# Patient Record
Sex: MALE | Race: WHITE | Employment: FULL TIME | URBAN - METROPOLITAN AREA
[De-identification: names, ages, dates, MRNs, and addresses within clinical notes are randomized per-mention and may not be internally consistent; named-entity substitution may affect disease eponyms.]

---

## 2019-02-19 ENCOUNTER — OFFICE VISIT (OUTPATIENT)
Dept: FAMILY MEDICINE CLINIC | Facility: CLINIC | Age: 44
End: 2019-02-19
Payer: COMMERCIAL

## 2019-02-19 VITALS
WEIGHT: 176 LBS | DIASTOLIC BLOOD PRESSURE: 86 MMHG | TEMPERATURE: 99 F | OXYGEN SATURATION: 98 % | RESPIRATION RATE: 16 BRPM | SYSTOLIC BLOOD PRESSURE: 138 MMHG | BODY MASS INDEX: 21.88 KG/M2 | HEART RATE: 92 BPM | HEIGHT: 75 IN

## 2019-02-19 DIAGNOSIS — G62.9 NEUROPATHY: Primary | ICD-10-CM

## 2019-02-19 DIAGNOSIS — F10.20 ALCOHOLISM (HCC): ICD-10-CM

## 2019-02-19 DIAGNOSIS — F41.9 ANXIETY: ICD-10-CM

## 2019-02-19 PROBLEM — F95.2 TOURETTE SYNDROME: Status: ACTIVE | Noted: 2019-02-19

## 2019-02-19 PROBLEM — I48.0 PAROXYSMAL ATRIAL FIBRILLATION (HCC): Status: ACTIVE | Noted: 2018-01-06

## 2019-02-19 PROBLEM — J84.82 LANGERHANS CELL HISTIOCYTOSIS OF LUNG (HCC): Status: ACTIVE | Noted: 2019-02-19

## 2019-02-19 PROBLEM — S03.00XA TMJ (DISLOCATION OF TEMPOROMANDIBULAR JOINT), INITIAL ENCOUNTER: Status: ACTIVE | Noted: 2018-01-06

## 2019-02-19 PROBLEM — F10.282 ALCOHOL DEPENDENCE WITH ALCOHOL-INDUCED SLEEP DISORDER (HCC): Status: ACTIVE | Noted: 2017-03-10

## 2019-02-19 PROCEDURE — 99203 OFFICE O/P NEW LOW 30 MIN: CPT | Performed by: FAMILY MEDICINE

## 2019-02-19 RX ORDER — RANITIDINE 150 MG/1
CAPSULE ORAL 2 TIMES DAILY
COMMUNITY
End: 2020-03-26 | Stop reason: ALTCHOICE

## 2019-02-19 RX ORDER — LORAZEPAM 1 MG/1
TABLET ORAL
Refills: 1 | COMMUNITY
Start: 2019-02-04 | End: 2019-05-03

## 2019-02-19 RX ORDER — GABAPENTIN 100 MG/1
100 CAPSULE ORAL NIGHTLY
Qty: 30 CAPSULE | Refills: 0 | Status: SHIPPED | OUTPATIENT
Start: 2019-02-19 | End: 2019-03-04

## 2019-02-19 NOTE — PROGRESS NOTES
Enrrique Julian is a 39year old male. Patient presents with:  Establish Care: numbness in both feet, tingling, pins and needle-feeling, feet look discolored, saw podiatrist, painful  Depression      HPI:   Burning, tingling, numbness in feet and legs.  Start G            RASH        Current Outpatient Medications:  gabapentin 100 MG Oral Cap Take 1 capsule (100 mg total) by mouth nightly. Disp: 30 capsule Rfl: 0   metoprolol Tartrate 25 MG Oral Tab Take 25 mg by mouth 2 (two) times daily.  Disp:  Rfl: 1   [facility]: alert and appropriate, CN II-XII intact, motor 5/5 b/l UE and LE and sensation decreased in lower legs and feet, almost no sensation on b/l feet to monofilament testing, DTR's 2+ in b/l UE and LE      ASSESSMENT AND PLAN:     Neuropathy  (primary issues and agrees to the plan.

## 2019-03-04 ENCOUNTER — TELEPHONE (OUTPATIENT)
Dept: FAMILY MEDICINE CLINIC | Facility: CLINIC | Age: 44
End: 2019-03-04

## 2019-03-04 DIAGNOSIS — G57.93 NEUROPATHY INVOLVING BOTH LOWER EXTREMITIES: Primary | ICD-10-CM

## 2019-03-04 DIAGNOSIS — G62.9 NEUROPATHY: ICD-10-CM

## 2019-03-04 RX ORDER — GABAPENTIN 100 MG/1
100 CAPSULE ORAL 3 TIMES DAILY
Qty: 90 CAPSULE | Refills: 0 | Status: SHIPPED | OUTPATIENT
Start: 2019-03-04 | End: 2019-03-20

## 2019-03-04 NOTE — TELEPHONE ENCOUNTER
Patient notified and verbalized understanding.      Patient advised office will call once we get response from insurance to let him know if ok to schedule    Hold for authorization

## 2019-03-04 NOTE — TELEPHONE ENCOUNTER
Patient notified records have been requested so Dr Chuck Hernandez can reviewed. Patient states numbness in feet and legs is worsening. Gabapentin is not helping. Hands are having numbness now too. States [place] [facility] fine.  They rec

## 2019-03-04 NOTE — TELEPHONE ENCOUNTER
Lets increase the gabapentin to TID. Looks like he had an appt today with Dr. Eyad Pascual, but couldn't make it? Lets see if higher dose gabapentin helps in the mean time, and get records.      Will get MRI in the mean time and see if we can get them appro

## 2019-03-04 NOTE — TELEPHONE ENCOUNTER
Pt said that KE referred him to a neurologist, but it isn't for 2 weeks and it is a consult appt. Pt is in such pain, he went to [facility] ER and they adivsed him that he should have his MD order an MRI of his head and his back.  Pt asks if KE will place order for

## 2019-03-05 ENCOUNTER — MED REC SCAN ONLY (OUTPATIENT)
Dept: FAMILY MEDICINE CLINIC | Facility: CLINIC | Age: 44
End: 2019-03-05

## 2019-03-07 NOTE — TELEPHONE ENCOUNTER
Patient notified and verbalized understanding. Number to central scheduling provided to patient. Hold to confirm patient was able to schedule.

## 2019-03-07 NOTE — TELEPHONE ENCOUNTER
Called and spoke with Dr. Paris Trimble at [facility] for peer to peer. After discussion, both MRI's were approved with the following approval codes.      Brain MRI: [id number]    Exp 4/21/19    Lumbar MRI: [id number]  Exp 4/21/19

## 2019-03-07 NOTE — TELEPHONE ENCOUNTER
Per referral notes:  ----- Message -----  From: Earlene Núñez  Sent: 3/6/2019   9:07 AM  To: Krystina Payne Clinical Staff  Subject: PTP                                               Good morning!     Per [place]:  Case Number [id number]  A Ph

## 2019-03-08 NOTE — TELEPHONE ENCOUNTER
Future Appointments   Date Time Provider Rosa Delgado   3/11/2019 12:45 PM PF MRI MOBILE (1.5T) PF MRI [facility]   3/11/2019  1:30 PM PF MRI MOBILE (1.5T) PF MRI [facility]   3/20/2019  3:20 PM MD JEYSON Guerrero EMG Evonne Arzola

## 2019-03-15 ENCOUNTER — HOSPITAL ENCOUNTER (OUTPATIENT)
Dept: MRI IMAGING | Age: 44
Discharge: HOME | End: 2019-03-15
Attending: FAMILY MEDICINE
Payer: COMMERCIAL

## 2019-03-15 DIAGNOSIS — G62.9 NEUROPATHY: ICD-10-CM

## 2019-03-15 DIAGNOSIS — G57.93 NEUROPATHY INVOLVING BOTH LOWER EXTREMITIES: ICD-10-CM

## 2019-03-15 PROCEDURE — 70553 MRI BRAIN STEM W/O & W/DYE: CPT | Performed by: FAMILY MEDICINE

## 2019-03-15 PROCEDURE — A9575 INJ GADOTERATE MEGLUMI 0.1ML: HCPCS | Performed by: FAMILY MEDICINE

## 2019-03-15 PROCEDURE — 72148 MRI LUMBAR SPINE W/O DYE: CPT | Performed by: FAMILY MEDICINE

## 2019-03-20 ENCOUNTER — OFFICE VISIT (OUTPATIENT)
Dept: NEUROLOGY | Facility: CLINIC | Age: 44
End: 2019-03-20
Payer: COMMERCIAL

## 2019-03-20 VITALS
WEIGHT: 176 LBS | DIASTOLIC BLOOD PRESSURE: 88 MMHG | RESPIRATION RATE: 16 BRPM | BODY MASS INDEX: 21.88 KG/M2 | SYSTOLIC BLOOD PRESSURE: 131 MMHG | HEART RATE: 86 BPM | HEIGHT: 75 IN

## 2019-03-20 DIAGNOSIS — M79.2 NEUROPATHIC PAIN: ICD-10-CM

## 2019-03-20 DIAGNOSIS — R25.3 FASCICULATIONS OF MUSCLE: ICD-10-CM

## 2019-03-20 DIAGNOSIS — R29.2 BRISK DEEP TENDON REFLEXES: ICD-10-CM

## 2019-03-20 DIAGNOSIS — R29.2 BABINSKI SIGN: ICD-10-CM

## 2019-03-20 DIAGNOSIS — F10.10 ALCOHOL ABUSE: ICD-10-CM

## 2019-03-20 DIAGNOSIS — R20.2 NUMBNESS AND TINGLING: ICD-10-CM

## 2019-03-20 DIAGNOSIS — R29.898 LEFT HAND WEAKNESS: Primary | ICD-10-CM

## 2019-03-20 DIAGNOSIS — R20.0 NUMBNESS AND TINGLING: ICD-10-CM

## 2019-03-20 PROCEDURE — 99244 OFF/OP CNSLTJ NEW/EST MOD 40: CPT | Performed by: OTHER

## 2019-03-20 RX ORDER — AMITRIPTYLINE HYDROCHLORIDE 10 MG/1
TABLET, FILM COATED ORAL
Qty: 60 TABLET | Refills: 2 | Status: SHIPPED | OUTPATIENT
Start: 2019-03-20 | End: 2019-06-12

## 2019-03-20 NOTE — H&P
[facility] Patient / Consult Visit    Enrrique Julian is a 39year old male.                          Referring MD: Markie Brumfield    Patient presents with:  Neuropathy: C/O of  numbness and pain in the feet,legs,toes and hand fingers,b reviewed. No pertinent surgical history.   Social History    Tobacco Use      Smoking status: Current Every Day Smoker        Packs/day: 1.00        Types: Cigarettes      Smokeless tobacco: Never Used      Tobacco comment: on and off since age 24    Alcoho optic discs sharp bilaterally    Cranial Nerves: II through XII  Optic:    Pupils: equally round and reactive to light with direct and consensual responses, normal accomodation   Visual acuity: Normal              Visual fields: Normal  Oculomotor/Trochlea basal cisterns are patent. There is no acute intracranial hemorrhage or extra-axial fluid collection identified. There is a small amount of T2 hyperintense signal within the left frontal periventricular white matter.   There is a punctate T2 hyperint neural foraminal   stenosis. PARASPINAL AREA:  Normal with no visible mass. BONY STRUCTURES:  No fracture, pars defect, significant scoliosis, or osseous lesion.     CORD/CAUDA EQUINA:  Normal caliber, contour, and signal intensity.       =====  CONC upgoing toes, may suggest a concurrent myelopathy. In order to further evaluate will plan for MRI of the cervical spine with and without contrast.    In addition, will evaluate for type/severity of neuropathy with nerve conduction study/EMG.     In addit VITAMIN B6, VITAMIN E, SERUM, EMG ([facility])        As noted above     (F10.10) Alcohol abuse  Plan: EMG ([facility])        As noted above     (M79.2) Neuropathic pain  Plan: MONOCLONAL PRO

## 2019-03-30 ENCOUNTER — APPOINTMENT (OUTPATIENT)
Dept: LAB | Age: 44
End: 2019-03-30
Attending: Other
Payer: COMMERCIAL

## 2019-03-30 ENCOUNTER — HOSPITAL ENCOUNTER (OUTPATIENT)
Dept: MRI IMAGING | Age: 44
Discharge: HOME | End: 2019-03-30
Attending: Other
Payer: COMMERCIAL

## 2019-03-30 DIAGNOSIS — R20.2 NUMBNESS AND TINGLING: ICD-10-CM

## 2019-03-30 DIAGNOSIS — R20.0 NUMBNESS AND TINGLING: ICD-10-CM

## 2019-03-30 DIAGNOSIS — R25.3 FASCICULATIONS OF MUSCLE: ICD-10-CM

## 2019-03-30 DIAGNOSIS — R29.898 LEFT HAND WEAKNESS: ICD-10-CM

## 2019-03-30 DIAGNOSIS — R29.2 BRISK DEEP TENDON REFLEXES: ICD-10-CM

## 2019-03-30 DIAGNOSIS — R29.2 BABINSKI SIGN: ICD-10-CM

## 2019-03-30 PROCEDURE — 72156 MRI NECK SPINE W/O & W/DYE: CPT | Performed by: OTHER

## 2019-03-30 PROCEDURE — 82565 ASSAY OF CREATININE: CPT

## 2019-03-30 PROCEDURE — A9575 INJ GADOTERATE MEGLUMI 0.1ML: HCPCS | Performed by: OTHER

## 2019-04-03 ENCOUNTER — PROCEDURE VISIT (OUTPATIENT)
Dept: NEUROLOGY | Facility: CLINIC | Age: 44
End: 2019-04-03
Payer: COMMERCIAL

## 2019-04-03 DIAGNOSIS — F10.10 ALCOHOL ABUSE: ICD-10-CM

## 2019-04-03 DIAGNOSIS — R20.0 NUMBNESS AND TINGLING: Primary | ICD-10-CM

## 2019-04-03 DIAGNOSIS — R29.898 LEFT HAND WEAKNESS: ICD-10-CM

## 2019-04-03 DIAGNOSIS — R25.3 FASCICULATIONS OF MUSCLE: ICD-10-CM

## 2019-04-03 DIAGNOSIS — R20.2 NUMBNESS AND TINGLING: Primary | ICD-10-CM

## 2019-04-03 DIAGNOSIS — R29.2 BRISK DEEP TENDON REFLEXES: ICD-10-CM

## 2019-04-03 PROCEDURE — 95913 NRV CNDJ TEST 13/> STUDIES: CPT | Performed by: OTHER

## 2019-04-03 PROCEDURE — 95886 MUSC TEST DONE W/N TEST COMP: CPT | Performed by: OTHER

## 2019-04-03 NOTE — PROCEDURES
Courtney  [address] [facility] [place], [address] [place] [address]  Ph: [phone]  FAX: [phone]        Full Name: Ernestina Catherine Gender: Male  Patient ID: [id number] YOB: 1975      Visit Date: 4/3/2019 13:55  Age: 2 Wrist Dig V 3.33 4.11 12.1  Wrist - Dig V 13  39   R Ulnar - Digit V (Antidromic)      Wrist Dig V 2.81 3.65 14.9 14.4 Wrist - Dig V 13  46   L Radial - Anatomical snuff box (Forearm)      Forearm Wrist 1.98 2.66 16.4 16.9 Forearm - Wrist 10  51      2 L Peroneal - EDB NR NR NR   L Tibial - AH 76.3 7.3 69.0   L Median - APB 36.0 4.0 32.0   L Ulnar - ADM 37.9 3.9 34.0   R Median - APB 34.2 4.0 30.3   R Ulnar - ADM 33.7 3.8 30.0   R Tibial - AH NR NR NR   R Peroneal - EDB NR NR NR       EMG         EMG Sum Nerve conduction studies:  1. Right sural sensory response was abnormal due to prolonged peak latency, with normal amplitude, and markedly slowed conduction velocity (approximately 31 m/s)  2.   Left sural sensory response was abnormal due to prolonged pea 12.  Left tibial motor response was abnormal due to moderately prolonged distal motor latency, with moderately reduced amplitude, and markedly slowed conduction velocity (approximately 31 m/s); F wave response was prolonged.   13.  Right median motor respon

## 2019-04-12 ENCOUNTER — APPOINTMENT (OUTPATIENT)
Dept: LAB | Age: 44
End: 2019-04-12
Attending: FAMILY MEDICINE
Payer: COMMERCIAL

## 2019-04-12 DIAGNOSIS — R20.2 NUMBNESS AND TINGLING: ICD-10-CM

## 2019-04-12 DIAGNOSIS — R29.898 LEFT HAND WEAKNESS: ICD-10-CM

## 2019-04-12 DIAGNOSIS — M79.2 NEUROPATHIC PAIN: ICD-10-CM

## 2019-04-12 DIAGNOSIS — R20.0 NUMBNESS AND TINGLING: ICD-10-CM

## 2019-04-12 DIAGNOSIS — R25.3 FASCICULATIONS OF MUSCLE: ICD-10-CM

## 2019-04-12 DIAGNOSIS — R29.2 BRISK DEEP TENDON REFLEXES: ICD-10-CM

## 2019-04-12 PROCEDURE — 86140 C-REACTIVE PROTEIN: CPT

## 2019-04-12 PROCEDURE — 86334 IMMUNOFIX E-PHORESIS SERUM: CPT

## 2019-04-12 PROCEDURE — 84165 PROTEIN E-PHORESIS SERUM: CPT

## 2019-04-12 PROCEDURE — 83883 ASSAY NEPHELOMETRY NOT SPEC: CPT

## 2019-04-12 PROCEDURE — 84425 ASSAY OF VITAMIN B-1: CPT

## 2019-04-12 PROCEDURE — 84207 ASSAY OF VITAMIN B-6: CPT

## 2019-04-12 PROCEDURE — 36415 COLL VENOUS BLD VENIPUNCTURE: CPT

## 2019-04-12 PROCEDURE — 86038 ANTINUCLEAR ANTIBODIES: CPT

## 2019-04-12 PROCEDURE — 85652 RBC SED RATE AUTOMATED: CPT

## 2019-04-12 PROCEDURE — 84446 ASSAY OF VITAMIN E: CPT

## 2019-04-25 ENCOUNTER — TELEPHONE (OUTPATIENT)
Dept: NEUROLOGY | Facility: CLINIC | Age: 44
End: 2019-04-25

## 2019-04-25 DIAGNOSIS — G62.9 NEUROPATHY: Primary | ICD-10-CM

## 2019-04-29 NOTE — TELEPHONE ENCOUNTER
Notified Amrit Dawson, that his labs and images are normal. Dr Candace Trivedi would like to get CSF studies done. Patient wants to do further testing. Needs order for CSF and Ab panel.

## 2019-04-30 ENCOUNTER — EXTERNAL RECORD (OUTPATIENT)
Dept: OTHER | Age: 44
End: 2019-04-30

## 2019-05-03 ENCOUNTER — TELEPHONE (OUTPATIENT)
Dept: NEUROLOGY | Facility: CLINIC | Age: 44
End: 2019-05-03

## 2019-05-03 ENCOUNTER — TELEPHONE (OUTPATIENT)
Dept: FAMILY MEDICINE CLINIC | Facility: CLINIC | Age: 44
End: 2019-05-03

## 2019-05-03 DIAGNOSIS — R20.2 NUMBNESS AND TINGLING: Primary | ICD-10-CM

## 2019-05-03 DIAGNOSIS — R29.898 LEFT HAND WEAKNESS: ICD-10-CM

## 2019-05-03 DIAGNOSIS — R20.0 NUMBNESS AND TINGLING: Primary | ICD-10-CM

## 2019-05-03 RX ORDER — LORAZEPAM 1 MG/1
1 TABLET ORAL 2 TIMES DAILY PRN
Qty: 30 TABLET | Refills: 1 | Status: SHIPPED
Start: 2019-05-03 | End: 2019-06-21

## 2019-05-03 NOTE — TELEPHONE ENCOUNTER
Last refill listed as historical  Last OV 2/19/19 established care with Dr Diaz Higgins  No future appointments. Nothing pended. Thank you.

## 2019-05-03 NOTE — TELEPHONE ENCOUNTER
Call back from patient. States he has not tried the zoloft. States he has been taking the lorazepam BID 1mg. States he has enough tablets for the next 2-3 days.

## 2019-05-03 NOTE — TELEPHONE ENCOUNTER
Looks like Dr. Abbey Bruce had wanted him to restart the Zoloft for the anxiety, did he do this. She wanted him off the Xanax as well. If needed we can give rx for #10 of the Xanax.  Thanks

## 2019-05-03 NOTE — TELEPHONE ENCOUNTER
Pt called, needs refill on LORazepam 1 MG Oral Tab. [facility] [place].   Please call pt at [phone]

## 2019-05-03 NOTE — TELEPHONE ENCOUNTER
Let's refill 15 days worth of Lorazepam for him. He needs to make an appt with Dr. Osmany Cronin or at least discuss treatment options for the anxiety.  Rx ready to

## 2019-05-03 NOTE — TELEPHONE ENCOUNTER
Left detailed message advising Dr Vega Moffett' note below. Ok per [email or web address] form. Advised to call office back and let us know what he would like to do.

## 2019-05-31 ENCOUNTER — HOSPITAL ENCOUNTER (OUTPATIENT)
Dept: GENERAL RADIOLOGY | Facility: HOSPITAL | Age: 44
Discharge: HOME | End: 2019-05-31
Attending: Other
Payer: COMMERCIAL

## 2019-06-12 VITALS — WEIGHT: 170 LBS | BODY MASS INDEX: 19.67 KG/M2 | HEIGHT: 78 IN

## 2019-06-13 ENCOUNTER — TELEPHONE (OUTPATIENT)
Dept: NEUROLOGY | Facility: CLINIC | Age: 44
End: 2019-06-13

## 2019-06-13 NOTE — TELEPHONE ENCOUNTER
Received refill request for patient's Amitriptyline. Asked to verify dosage for refill purposes. Refill request with starter instructions. Per Epic review, patient stopped taking medication. Awaiting call back concerning refill.     Request for

## 2019-06-19 NOTE — TELEPHONE ENCOUNTER
Talked with Demetria Figueroa, Patient has stopped taking Amitriptyline. No reasons provided why he stopped the medication.

## 2019-06-21 ENCOUNTER — HOSPITAL ENCOUNTER (OUTPATIENT)
Dept: GENERAL RADIOLOGY | Facility: HOSPITAL | Age: 44
Discharge: HOME | End: 2019-06-21
Attending: Other
Payer: COMMERCIAL

## 2019-06-21 ENCOUNTER — APPOINTMENT (OUTPATIENT)
Dept: LAB | Facility: HOSPITAL | Age: 44
End: 2019-06-21
Attending: RADIOLOGY
Payer: COMMERCIAL

## 2019-06-21 DIAGNOSIS — R20.2 NUMBNESS AND TINGLING: Primary | ICD-10-CM

## 2019-06-21 DIAGNOSIS — R20.0 NUMBNESS AND TINGLING: Primary | ICD-10-CM

## 2019-06-21 NOTE — PROGRESS NOTES
Mckinley Rivera is a 39year old male. Patient presents with: Follow - Up: on meds, discuss meds for flying      HPI:   Anxiety with flying: has never flown before, would like some vallium or something to help with that.    He currently takes ativan 1 mg bid impairment     bilateral- No HA's   • High blood pressure    • Muscle weakness     Zack hand   • Neuropathy     Zack hands/legs and feet   • Tourette syndrome    • Visual impairment     glasses for reading      Social History:  Social History    Tobacco Use tablet (1 mg total) by mouth 2 (two) times daily as needed for Anxiety. Tourette syndrome  -     LORazepam 1 MG Oral Tab; Take 1 tablet (1 mg total) by mouth 2 (two) times daily as needed for Anxiety. -     metoprolol Tartrate 25 MG Oral Tab;  Take 1 ta

## 2019-07-20 DIAGNOSIS — F41.9 ANXIETY: ICD-10-CM

## 2019-07-20 DIAGNOSIS — F95.2 TOURETTE SYNDROME: ICD-10-CM

## 2019-07-20 RX ORDER — LORAZEPAM 1 MG/1
1 TABLET ORAL 2 TIMES DAILY PRN
Qty: 30 TABLET | Refills: 0 | Status: SHIPPED | OUTPATIENT
Start: 2019-07-20 | End: 2019-07-30

## 2019-07-20 NOTE — TELEPHONE ENCOUNTER
Patient states that he has not started Sertraline. Has Sertraline 50mg at home. Advised to start Sertraline 50 mg daily. Needs to follow up with Dr. Gareth Sanchez. LINDY.o. Dr. Gareth Sanchez. Script faxed. Appointment scheduled.    Future Appointments   Date Time Prov

## 2019-07-20 NOTE — TELEPHONE ENCOUNTER
Pt called, needs refill on LORazepam 1 MG Oral Tab. Pharmacy[facility] [place].    Please call pt at [phone]

## 2019-07-20 NOTE — TELEPHONE ENCOUNTER
Script printed. Please call and see how he is doing. Did he restart his sertraline that he had at home? He needs to follow up with me in the office. Otherwise, I am going to start weaning down this medication and stop prescribing it.

## 2019-07-30 NOTE — PROGRESS NOTES
Chace Eid is a 38year old male. Patient presents with: Follow - Up: on medications  Cold: chest congestion in the am      HPI:   Medications: never started sertraline. Taking other meds as scheduled. Alcohol: not cutting back.  Trying to be more q Social History:  Social History    Tobacco Use      Smoking status: Current Every Day Smoker        Packs/day: 1.00        Types: Cigarettes      Smokeless tobacco: Never Used      Tobacco comment: on and off since age 24    Alcohol use:  Yes      Alcohol topical capsaicin cream script sent. Anxiety  - pt agrees to start sertraline, otherwise I will stop prescribing his lorazepam/wean him down. - can refill lorazepam for a month      Follow up with me in 2 months or sooner if not doing well.        No or

## 2019-08-31 NOTE — TELEPHONE ENCOUNTER
Pt is an alcoholic and needs help. I have been trying to get him connected with resources, but he often cancels appointments and does not follow up.    Last time I saw him, I printed out a list of AA meetings in the area for him because he said he couldn't

## 2019-10-02 ENCOUNTER — TELEPHONE (OUTPATIENT)
Dept: FAMILY MEDICINE CLINIC | Facility: CLINIC | Age: 44
End: 2019-10-02

## 2019-10-02 DIAGNOSIS — F41.9 ANXIETY: ICD-10-CM

## 2019-10-02 DIAGNOSIS — F95.2 TOURETTE SYNDROME: ICD-10-CM

## 2019-10-02 NOTE — TELEPHONE ENCOUNTER
Pt is moving out of state this coming Saturday. He would like to know if he can get refills with enough meds for a couple months until he can find a new PCP.      He is almost out of the   LORazepam 1 MG Oral Tab  Only 2 days left  He would like to know if

## 2019-10-03 RX ORDER — LORAZEPAM 1 MG/1
1 TABLET ORAL 2 TIMES DAILY PRN
Qty: 60 TABLET | Refills: 0 | Status: SHIPPED | OUTPATIENT
Start: 2019-10-03 | End: 2019-11-01

## 2019-10-03 NOTE — TELEPHONE ENCOUNTER
I cannot give him more than a month of lorazepam. I will give him that much. He should be able to find a new doctor in that time. The last script I have him lasted him 2 months. Script sent.

## 2019-11-01 DIAGNOSIS — F41.9 ANXIETY: ICD-10-CM

## 2019-11-01 DIAGNOSIS — F95.2 TOURETTE SYNDROME: ICD-10-CM

## 2019-11-01 RX ORDER — LORAZEPAM 1 MG/1
1 TABLET ORAL 2 TIMES DAILY PRN
Qty: 10 TABLET | Refills: 0 | Status: SHIPPED | OUTPATIENT
Start: 2019-11-01 | End: 2019-11-07

## 2019-11-01 NOTE — TELEPHONE ENCOUNTER
Please let patient or caregiver know or leave message that #10 pills were sent. Further refills from PCP.   Thanks

## 2019-11-01 NOTE — TELEPHONE ENCOUNTER
Pt needs a refill of the  LORazepam 1 MG Oral Tab  He only has 3 pills left. [facility]  - [place], [place]    Pt moved back to [place], and is working on getting his insurance back in order.      Please return call to [phone]

## 2019-11-01 NOTE — TELEPHONE ENCOUNTER
Called pt, advised him  refilled #10 pills and for further refills from KE. Verbally understood with no further questions.

## 2019-11-01 NOTE — TELEPHONE ENCOUNTER
Last refilled 10/3/19 with 0 refills for #60  Last OV 7/30/19  No future appointments   Pt called and has 3 pills left as of 11/1/19    He is needing it right away, taking it twice daily and has enough to get through until tomorrow.

## 2019-11-06 DIAGNOSIS — F95.2 TOURETTE SYNDROME: ICD-10-CM

## 2019-11-06 DIAGNOSIS — F41.9 ANXIETY: ICD-10-CM

## 2019-11-06 NOTE — TELEPHONE ENCOUNTER
He told me 10/2 that he was moving out of the state that weekend and needed a longer supply, but last script I gave him lasted 2 months. Where is he living now? He should be looking for a new PCP if out of state. Also, he needs to give us more time.  Ever

## 2019-11-06 NOTE — TELEPHONE ENCOUNTER
Pt needs a refill of the  LORazepam 1 MG Oral Tab   [facility] [place].   Pt only has 3 left. Needs it by tomorrow.

## 2019-11-07 RX ORDER — LORAZEPAM 1 MG/1
1 TABLET ORAL 2 TIMES DAILY PRN
Qty: 60 TABLET | Refills: 0 | Status: SHIPPED | OUTPATIENT
Start: 2019-11-07 | End: 2019-11-08

## 2019-11-07 NOTE — TELEPHONE ENCOUNTER
Patient moved to [facility] for 2 weeks but ended up moving back to [place]. Does not have insurance until Jan 1st.  States he is taking Lorazepam BID every day.

## 2019-11-07 NOTE — TELEPHONE ENCOUNTER
Patient called regarding his refill of the Lorazepam. Patient states that he only has 1 pill left now. No meds for the morning. Please call patient back to advise.

## 2019-11-08 ENCOUNTER — TELEPHONE (OUTPATIENT)
Dept: FAMILY MEDICINE CLINIC | Facility: CLINIC | Age: 44
End: 2019-11-08

## 2019-11-08 DIAGNOSIS — F95.2 TOURETTE SYNDROME: ICD-10-CM

## 2019-11-08 DIAGNOSIS — F41.9 ANXIETY: ICD-10-CM

## 2019-11-08 RX ORDER — LORAZEPAM 1 MG/1
1 TABLET ORAL 2 TIMES DAILY PRN
Qty: 60 TABLET | Refills: 0 | Status: SHIPPED | OUTPATIENT
Start: 2019-11-08 | End: 2019-12-10

## 2019-11-08 NOTE — TELEPHONE ENCOUNTER
Also, if he is in [facility], he should find a new doctor closer to him.

## 2019-11-08 NOTE — TELEPHONE ENCOUNTER
Patient notified and verbalized understanding. States he is living in [facility] currently. Will not be able to get insurance for a few months.  States he will consider switching providers if still in [facility] at that time

## 2019-11-08 NOTE — TELEPHONE ENCOUNTER
[facility] [place] [facility] where he requested it to be sent.  He is upset, needs this tonight please fix and call back ASAP

## 2019-12-10 DIAGNOSIS — F41.9 ANXIETY: ICD-10-CM

## 2019-12-10 DIAGNOSIS — F95.2 TOURETTE SYNDROME: ICD-10-CM

## 2019-12-10 RX ORDER — LORAZEPAM 1 MG/1
1 TABLET ORAL 2 TIMES DAILY PRN
Qty: 60 TABLET | Refills: 0 | Status: SHIPPED | OUTPATIENT
Start: 2019-12-10 | End: 2020-01-10

## 2019-12-10 NOTE — TELEPHONE ENCOUNTER
I will send 1 month, then he needs to be seen before more refills.  He should have insurance after the first of the year according to what he has said in the past.

## 2019-12-10 NOTE — TELEPHONE ENCOUNTER
Pt needs refills of  metoprolol Tartrate 25 MG Oral Tab    &  LORazepam 1 MG Oral Tab      Pt would like these refill sent [facility]    Pt will be out of medication on Thursday .

## 2019-12-10 NOTE — TELEPHONE ENCOUNTER
Last OV 7/30/19   Last refilled:  11/8/19  Lorazepam #60  0 refills  6/21/19  Metoprolol  #60  0 refills

## 2019-12-10 NOTE — TELEPHONE ENCOUNTER
Patient notified and verbalized understanding.    States he will call back to schedule f/u with KE in January

## 2019-12-12 ENCOUNTER — TELEPHONE (OUTPATIENT)
Dept: FAMILY MEDICINE CLINIC | Facility: CLINIC | Age: 44
End: 2019-12-12

## 2019-12-12 NOTE — TELEPHONE ENCOUNTER
Patient called to say the pharmacy does not have both meds sent 12/10/19. Patient aware RN to call pharmacy to provide script. Called and spoke with pharmacy staff who states lorazepam was received and is ready for patient .   Script for [place]

## 2020-01-04 ENCOUNTER — TELEPHONE (OUTPATIENT)
Dept: FAMILY MEDICINE CLINIC | Facility: CLINIC | Age: 45
End: 2020-01-04

## 2020-01-04 NOTE — TELEPHONE ENCOUNTER
Spoke with patient who states he does not know what to do at this point. Patient states he lost his job, is homeless, is currently at a friends house but cant stay there any longer. Stays in his truck when he can't find anywhere to stay.   Says he is an al

## 2020-01-06 NOTE — TELEPHONE ENCOUNTER
Patient states he went to the ER Saturday and they released him Saturday at midnight. They allowed him to sleep in the waiting room. States he is waiting on a call back from an intake person from a place in [address] [place] [address] regarding detox.    He stayed in

## 2020-01-10 ENCOUNTER — TELEPHONE (OUTPATIENT)
Dept: FAMILY MEDICINE CLINIC | Facility: CLINIC | Age: 45
End: 2020-01-10

## 2020-01-10 DIAGNOSIS — F95.2 TOURETTE SYNDROME: ICD-10-CM

## 2020-01-10 DIAGNOSIS — F41.9 ANXIETY: ICD-10-CM

## 2020-01-10 RX ORDER — LORAZEPAM 1 MG/1
1 TABLET ORAL 2 TIMES DAILY PRN
Qty: 60 TABLET | Refills: 0 | COMMUNITY
Start: 2020-01-10 | End: 2020-02-06

## 2020-01-10 RX ORDER — LORAZEPAM 1 MG/1
1 TABLET ORAL 2 TIMES DAILY PRN
Qty: 60 TABLET | Refills: 0 | Status: SHIPPED | OUTPATIENT
Start: 2020-01-10 | End: 2020-01-10

## 2020-01-10 NOTE — TELEPHONE ENCOUNTER
Spoke with patient who states he does not have insurance and has limited funds. Looked on good rx and found prices at [employer] are affordable with coupon.    Requests script to [place] in [place]   Discussed with patient lorazepam should not be stopped cold tur

## 2020-01-10 NOTE — TELEPHONE ENCOUNTER
Patient needs a refill on his lorazepam and [facility]rolol [facility] [place]. Please call back to let him know it's been called in.

## 2020-01-10 NOTE — TELEPHONE ENCOUNTER
Valerie Martinez who states she was in contact with patient and discussed options and resources with him. States she was advised to keep office updated and call if further assistance needed.      nena ROE.

## 2020-02-06 DIAGNOSIS — F95.2 TOURETTE SYNDROME: ICD-10-CM

## 2020-02-06 DIAGNOSIS — F41.9 ANXIETY: ICD-10-CM

## 2020-02-06 RX ORDER — LORAZEPAM 1 MG/1
1 TABLET ORAL 2 TIMES DAILY PRN
Qty: 60 TABLET | Refills: 0 | Status: SHIPPED | OUTPATIENT
Start: 2020-02-06 | End: 2020-03-12

## 2020-02-06 NOTE — TELEPHONE ENCOUNTER
Patient needs a refill on his Lorazepam and Metoprolol. He is currently out of the [place]. He wants them called into [address]. He is a  and is suppose to leave today.  He can get called out at anytime so he ne

## 2020-02-06 NOTE — TELEPHONE ENCOUNTER
Spoke with patient who states he was able to  scripts. Says his insurance will kick in ~ 30 days from now. Patient states he is doing well at this time. Has a job and is no longer homeless.  States he is working a job where he is gone 10-12 days at

## 2020-02-24 ENCOUNTER — TELEPHONE (OUTPATIENT)
Dept: FAMILY MEDICINE CLINIC | Facility: CLINIC | Age: 45
End: 2020-02-24

## 2020-02-24 NOTE — TELEPHONE ENCOUNTER
Kathy Max, DO  S[facility] Nurse             How is he doing? He was dx with DVT in ER [place] this weekend. Called pt, he states he is doing fine in regards to the DVT. Pt is taking Eliquis and doing fine on it.  He states he is still homeles

## 2020-02-25 ENCOUNTER — MED REC SCAN ONLY (OUTPATIENT)
Dept: FAMILY MEDICINE CLINIC | Facility: CLINIC | Age: 45
End: 2020-02-25

## 2020-02-25 ENCOUNTER — TELEPHONE (OUTPATIENT)
Dept: FAMILY MEDICINE CLINIC | Facility: CLINIC | Age: 45
End: 2020-02-25

## 2020-02-25 NOTE — TELEPHONE ENCOUNTER
Patient notified and verbalized understanding. Patient states he wants to get money to pay for the visit before scheduling. States he will call back in a few days to schedule.

## 2020-02-25 NOTE — TELEPHONE ENCOUNTER
I would like to see him now to make sure things are getting better, if possible. We do have some samples of eliquis in the office that we can give him. That will help with the cost of medication until he can get insurance.

## 2020-02-25 NOTE — TELEPHONE ENCOUNTER
Attempted to contact patient at home number but after 10 rings there was no answer or voicemail    Left message on cell voicemail/answering machine for patient to call office to schedule appt

## 2020-02-25 NOTE — TELEPHONE ENCOUNTER
Pt called wants to know how soon the Dr wanted to see him after being in the hospital on 2/16 for a blood clot?   They also placed Pt on elaquist and got a free 30 day sample but currently doesn't have insurance and they wanted him to take it for 6 month an

## 2020-03-11 DIAGNOSIS — F41.9 ANXIETY: ICD-10-CM

## 2020-03-11 DIAGNOSIS — F95.2 TOURETTE SYNDROME: ICD-10-CM

## 2020-03-12 RX ORDER — LORAZEPAM 1 MG/1
1 TABLET ORAL 2 TIMES DAILY PRN
Qty: 60 TABLET | Refills: 0 | Status: SHIPPED | OUTPATIENT
Start: 2020-03-12 | End: 2020-04-10

## 2020-03-13 ENCOUNTER — TELEPHONE (OUTPATIENT)
Dept: FAMILY MEDICINE CLINIC | Facility: CLINIC | Age: 45
End: 2020-03-13

## 2020-03-13 NOTE — TELEPHONE ENCOUNTER
Pt calling to ask for his blood clot medication. Says we give him samples bc his ins wont cover it. He said it starts with a \"o\".

## 2020-03-13 NOTE — TELEPHONE ENCOUNTER
Per 2/24/2020  And  2/25/2020 tel enc, patient is on eliquis    Future Appointments   Date Time Provider Rosa Delgado   3/23/2020  6:00 PM Frederick Miner DO [facility] [place] samples are available    Routed to BHUPINDER to advise

## 2020-03-13 NOTE — TELEPHONE ENCOUNTER
Patient states has 3 days left of medication. Needs 7 days worth to get to appointment. Patient aware office will provide samples to get him to appointment. Confirmed appt on 3/23/2020    7 days supply placed in  box in sample closet.

## 2020-03-23 ENCOUNTER — TELEPHONE (OUTPATIENT)
Dept: FAMILY MEDICINE CLINIC | Facility: CLINIC | Age: 45
End: 2020-03-23

## 2020-03-23 NOTE — TELEPHONE ENCOUNTER
Pt called to check appt time-it was suppose to be today at 4:30, but note states to reschedule. How far out are we to reschedule a f/u on a blood clot? Also pt states he has no insurance and Dr. Mancilla Friday was giving pt free samples of Eliquis. He's out.   P

## 2020-03-23 NOTE — TELEPHONE ENCOUNTER
Spoke with Praneeth pt schedule appt for next week. Left voice mail message for pt to call office to schedule appt for next week.

## 2020-03-23 NOTE — TELEPHONE ENCOUNTER
Left pt voice mail message that we do have 2 months worth of eliguist samples.   Please call the office when he is in the parking lot to pick them up and we will bring the bag to the vestibule and he can come in and pick them up there or we can bring to his

## 2020-03-23 NOTE — TELEPHONE ENCOUNTER
If he can reschedule for next week, then great. We can give him more samples. He is going to need 2 more months of eliquis to complete the 3 months of anticoagulation for his DVT.

## 2020-03-23 NOTE — TELEPHONE ENCOUNTER
appt was cancelled for today because Ke was not in office. Can reschedule for tomorrow as he needs to be seen.     Samples to be given at appt    Routed to  to reschedule

## 2020-03-23 NOTE — TELEPHONE ENCOUNTER
Called pt, left voice mail message for pt to call and schedule appt for next week and that we have 1 weeks worth of samples for him. Asked that pt call before he comes to  samples.

## 2020-03-23 NOTE — TELEPHONE ENCOUNTER
Called pt, left voice mail message that Dr. Brianna Thomas would be willing to see pt this week for the f/u for blood clot if pt wanted, for pt to call our office back. Waiting pt call back.

## 2020-03-24 ENCOUNTER — TELEPHONE (OUTPATIENT)
Dept: FAMILY MEDICINE CLINIC | Facility: CLINIC | Age: 45
End: 2020-03-24

## 2020-03-24 NOTE — TELEPHONE ENCOUNTER
Patient needs to be seen to f/u DVT (see 3/23/2020 tel enc)  Can schedule with DS this week for f/u and samples    Left message on voicemail/answering machine for patient to call office

## 2020-03-24 NOTE — TELEPHONE ENCOUNTER
Future Appointments   Date Time Provider Rosa Delgado   3/26/2020  3:00 PM Conchita, Miriam Perry, [facility] ZAKI Braxton

## 2020-03-26 ENCOUNTER — OFFICE VISIT (OUTPATIENT)
Dept: FAMILY MEDICINE CLINIC | Facility: CLINIC | Age: 45
End: 2020-03-26

## 2020-03-26 VITALS
RESPIRATION RATE: 18 BRPM | HEART RATE: 100 BPM | DIASTOLIC BLOOD PRESSURE: 88 MMHG | WEIGHT: 179 LBS | SYSTOLIC BLOOD PRESSURE: 116 MMHG | BODY MASS INDEX: 22.26 KG/M2 | HEIGHT: 75 IN | TEMPERATURE: 99 F

## 2020-03-26 DIAGNOSIS — I82.462 ACUTE DEEP VEIN THROMBOSIS (DVT) OF CALF MUSCLE VEIN OF LEFT LOWER EXTREMITY (HCC): Primary | ICD-10-CM

## 2020-03-26 DIAGNOSIS — I48.0 PAROXYSMAL ATRIAL FIBRILLATION (HCC): ICD-10-CM

## 2020-03-26 DIAGNOSIS — J84.82 ADULT PULMONARY LANGERHANS CELL HISTIOCYTOSIS (HCC): ICD-10-CM

## 2020-03-26 DIAGNOSIS — K21.00 GASTROESOPHAGEAL REFLUX DISEASE WITH ESOPHAGITIS: ICD-10-CM

## 2020-03-26 PROCEDURE — 99213 OFFICE O/P EST LOW 20 MIN: CPT | Performed by: FAMILY MEDICINE

## 2020-03-26 RX ORDER — CALCIUM CARBONATE 200(500)MG
1 TABLET,CHEWABLE ORAL DAILY
COMMUNITY
End: 2020-04-13

## 2020-03-26 NOTE — PROGRESS NOTES
Allan Robins is a 38year old male.   HPI:   Louis Scherer presents today for follow up after he was diagnosed with DVT of the left leg, and new onset of atrial fibrillation and was placed on metorprolol and Eliquis, denies any chest pain or palpitations, but he do Drinks per session: 5 or 6      Comment: daily    Drug use: No       REVIEW OF SYSTEMS:   GENERAL HEALTH: feels well otherwise  SKIN: denies any unusual skin lesions or rashes  RESPIRATORY: has baseline shortness of breath with exertion  CARDIOVASCULAR: d

## 2020-03-30 ENCOUNTER — TELEPHONE (OUTPATIENT)
Dept: FAMILY MEDICINE CLINIC | Facility: CLINIC | Age: 45
End: 2020-03-30

## 2020-03-30 DIAGNOSIS — R11.2 NON-INTRACTABLE VOMITING WITH NAUSEA, UNSPECIFIED VOMITING TYPE: ICD-10-CM

## 2020-03-30 DIAGNOSIS — F17.200 TOBACCO DEPENDENCE: ICD-10-CM

## 2020-03-30 DIAGNOSIS — F41.9 ANXIETY: ICD-10-CM

## 2020-03-30 DIAGNOSIS — K21.00 GASTROESOPHAGEAL REFLUX DISEASE WITH ESOPHAGITIS: Primary | ICD-10-CM

## 2020-03-30 DIAGNOSIS — F10.282 ALCOHOL DEPENDENCE WITH ALCOHOL-INDUCED SLEEP DISORDER (HCC): ICD-10-CM

## 2020-03-30 RX ORDER — OMEPRAZOLE 40 MG/1
40 CAPSULE, DELAYED RELEASE ORAL DAILY
Qty: 30 CAPSULE | Refills: 1 | Status: SHIPPED | OUTPATIENT
Start: 2020-03-30

## 2020-03-30 NOTE — TELEPHONE ENCOUNTER
Patient has been vomiting everyday when he first wakes up in the morning. Has been going on for 3 weeks. Now he hasn't eaten for three days because it's getting worse. He was seen in the office last week.  Said we kind of blew him off when he told us what w

## 2020-03-30 NOTE — TELEPHONE ENCOUNTER
Patient states that over the last day or 2 he has had a burning sensation in his mouth and lips. He denies any difficulty swallowing or breathing. He does note daily heartburn despite taking Tums frequently.   He does admit to being a smoker and drinking

## 2020-03-31 ENCOUNTER — TELEPHONE (OUTPATIENT)
Dept: FAMILY MEDICINE CLINIC | Facility: CLINIC | Age: 45
End: 2020-03-31

## 2020-03-31 DIAGNOSIS — F33.1 MODERATE EPISODE OF RECURRENT MAJOR DEPRESSIVE DISORDER (HCC): ICD-10-CM

## 2020-03-31 DIAGNOSIS — K29.20 ACUTE ALCOHOLIC GASTRITIS WITHOUT HEMORRHAGE: ICD-10-CM

## 2020-03-31 DIAGNOSIS — F10.20 ALCOHOLISM (HCC): ICD-10-CM

## 2020-03-31 DIAGNOSIS — K21.00 GASTROESOPHAGEAL REFLUX DISEASE WITH ESOPHAGITIS: Primary | ICD-10-CM

## 2020-03-31 RX ORDER — SUCRALFATE 1 G/1
2 TABLET ORAL
Qty: 120 TABLET | Refills: 0 | Status: SHIPPED | OUTPATIENT
Start: 2020-03-31 | End: 2020-04-13

## 2020-03-31 NOTE — TELEPHONE ENCOUNTER
Hasn't eaten in 4 days. Has a lot of phlem/mucous in throat. The past few days, his tongue and lips are on fire. Can't eat anything. Heartburn is uncontrollable. No fevers. Vomiting 1-2 times per day. No blood in vomit. Has mucous all day long.  No black

## 2020-03-31 NOTE — TELEPHONE ENCOUNTER
I call and it goes straight to \"the party you are trying to call is unavailable\"   After review, phone number is chart is wrong. Per scan is it [phone]. Can you update pt contact info in chart?

## 2020-03-31 NOTE — TELEPHONE ENCOUNTER
----- Message from Silvia Jeffrey RN sent at 3/31/2020  9:22 AM CDT -----  Regarding: FW: calling back  See notes from Dr Noe Moser in 3/30 tel enc  ----- Message -----  From: Meño Arthur  Sent: 3/31/2020   9:19 AM CDT  To: Leeanne Olguin Nurse  Subject:

## 2020-04-01 ENCOUNTER — MED REC SCAN ONLY (OUTPATIENT)
Dept: FAMILY MEDICINE CLINIC | Facility: CLINIC | Age: 45
End: 2020-04-01

## 2020-04-03 ENCOUNTER — TELEPHONE (OUTPATIENT)
Dept: FAMILY MEDICINE CLINIC | Facility: CLINIC | Age: 45
End: 2020-04-03

## 2020-04-03 DIAGNOSIS — K21.00 GASTROESOPHAGEAL REFLUX DISEASE WITH ESOPHAGITIS: Primary | ICD-10-CM

## 2020-04-03 DIAGNOSIS — R20.0 FACIAL NUMBNESS: ICD-10-CM

## 2020-04-03 NOTE — TELEPHONE ENCOUNTER
Called and spoke with pt. Numbness on both sides of face was coming and going ealier this week when I spoke with him, but now constant and annoying.  His cheeks, chin lips, tongue and lips are numb, like he got out of the dentist.   He can eat and is speaki 02-Nov-2019 11:09

## 2020-04-03 NOTE — TELEPHONE ENCOUNTER
Face is numb, no loss in sense of taste or smell. Has been going on for about a week. Please call back.

## 2020-04-03 NOTE — TELEPHONE ENCOUNTER
Patient states from the bottom of his eyes to the bottom of his chin is numb. .  States it feels like he just came home form the dentist  States it feels weird chewing but he can swallow. No choking on food when swallowing.   States he has no sensation if he

## 2020-04-07 ENCOUNTER — TELEPHONE (OUTPATIENT)
Dept: FAMILY MEDICINE CLINIC | Facility: CLINIC | Age: 45
End: 2020-04-07

## 2020-04-07 NOTE — TELEPHONE ENCOUNTER
Forward to Dr. Abbey Bruce, please advise on dosage and if ok to give samples. Do you know how long pt is suppose to be on meds? Pt said it was 6 months.       Pt states his insurance starts May 1st.

## 2020-04-07 NOTE — TELEPHONE ENCOUNTER
Patient has been notified, verbalized understanding of information. Denies further questions. Samples given to patient. Samples checked out in log book.

## 2020-04-07 NOTE — TELEPHONE ENCOUNTER
Yes,  6 months starting 2/16/2020. If we have samples available, he can pick them up, up to 2-3 weeks worth, unless we have other patients who need them as well. Eliquis 5 mg twice daily.

## 2020-04-08 DIAGNOSIS — F95.2 TOURETTE SYNDROME: ICD-10-CM

## 2020-04-08 NOTE — TELEPHONE ENCOUNTER
Protocol failed-due for labs    Last refilled on 3/12/20 for # 60 with 0 refills  No CMP or BMP on file  Last OV 3/26/20 for DVT  Future Appointments   Date Time Provider Rosa Delgado   4/13/2020  3:30 PM [facility] Millicent Tay

## 2020-04-10 DIAGNOSIS — F95.2 TOURETTE SYNDROME: ICD-10-CM

## 2020-04-10 DIAGNOSIS — F41.9 ANXIETY: ICD-10-CM

## 2020-04-10 RX ORDER — LORAZEPAM 1 MG/1
1 TABLET ORAL 2 TIMES DAILY PRN
Qty: 60 TABLET | Refills: 0 | Status: SHIPPED | OUTPATIENT
Start: 2020-04-10 | End: 2020-05-11

## 2020-04-10 NOTE — TELEPHONE ENCOUNTER
Lorazepam last refilled on 3/12/20 for # 60 with 0 refills  Last OV 3/26/20 saw DS for DVT  Future Appointments   Date Time Provider Rosa Delgado   4/13/2020  3:30 PM DO LUIS CARLOS Gaytan    metoprolol was sent to [facility] in [place]

## 2020-04-10 NOTE — TELEPHONE ENCOUNTER
LORazepam 1 MG Oral Tab  Pt is out of this medication     METOPROLOL TARTRATE 25 MG Oral Tab        Pt would like refill sent to   [facility] [address] , [address], [phone], [phone]

## 2020-04-13 PROBLEM — F41.9 ANXIETY: Status: ACTIVE | Noted: 2020-04-13

## 2020-04-13 PROBLEM — F10.10 ALCOHOL ABUSE: Status: ACTIVE | Noted: 2020-04-11

## 2020-04-13 NOTE — PROGRESS NOTES
Dee Dee Calvillo is a 38year old male. Patient presents with:  Hospital F/U: per pt, hospital follow up      HPI:   Face and lips and gums are still numb. He was in ER. They started gabapentin TID as below. Not sure if that is helping.  Numbness is not gettin daily.         Past Medical History:   Diagnosis Date   • Adult pulmonary Langerhans cell histiocytosis ([facility] [address] 2011    dx via biopsy   • Anxiety state    • Depression    • Esophageal reflux    • Hearing impairment     bilateral- No HA's   • High blood pressu atraumatic, normocephalic,  NECK: supple,no adenopathy,  LUNGS: clear to auscultation  CARDIO: RRR without murmur  GI: good BS's,no masses, HSM or tenderness  EXTREMITIES: no cyanosis, clubbing or edema, no calf tenderness or edema   Psych; normal affect

## 2020-05-11 DIAGNOSIS — F41.9 ANXIETY: ICD-10-CM

## 2020-05-11 DIAGNOSIS — F95.2 TOURETTE SYNDROME: ICD-10-CM

## 2020-05-11 RX ORDER — GABAPENTIN 300 MG/1
300 CAPSULE ORAL 3 TIMES DAILY
Qty: 90 CAPSULE | Refills: 0 | Status: SHIPPED | OUTPATIENT
Start: 2020-05-11 | End: 2020-06-09

## 2020-05-11 RX ORDER — LORAZEPAM 1 MG/1
1 TABLET ORAL 2 TIMES DAILY PRN
Qty: 60 TABLET | Refills: 0 | Status: SHIPPED | OUTPATIENT
Start: 2020-05-11 | End: 2020-06-09

## 2020-05-11 NOTE — TELEPHONE ENCOUNTER
Last OV 4/13/2020  Last lab 4-2019  Last refilled 4/10/2020 Lorazepam and metoprolol  #60  0 refills  Gabapentin not filled previously at this dose  apixaban has been given as samples

## 2020-05-11 NOTE — TELEPHONE ENCOUNTER
PT CALLED AND ADV NEEDS REFILLS OF     LORazepam 1 MG Oral Tab    metoprolol Tartrate 25 MG Oral Tab    gabapentin 300 MG Oral Cap    apixaban (ELIQUIS) tab     PLEASE SEND TO [place]

## 2020-05-12 NOTE — TELEPHONE ENCOUNTER
Patient notified and verbalized understanding.     Confirmed upcoming appt  Future Appointments   Date Time Provider Rosa Delgado   5/15/2020  3:00 PM Frida Blanton [facility] ZAKI Womack

## 2020-05-15 NOTE — PROGRESS NOTES
Roe Osei is a 38year old male. Patient presents with: Follow - Up: on blood clot, foot feels heavy      HPI:   Quit drinking for 3 weeks, then restarted. Back to 2-3 mikes lemonade per day.    Was taking sertraline and stopped when he started drinkin History:  Social History    Tobacco Use      Smoking status: Current Every Day Smoker        Packs/day: 1.00        Types: Cigarettes      Smokeless tobacco: Never Used      Tobacco comment: on and off since age 24    Alcohol use: Yes      Frequency: 4 or drinking again. I have tried to connect him with resources many times. He does not want to do AA. Tourette syndrome  - flared up, will monitor     Anxiety  - recommend restarting sertraline, though this may contribute to his recent ED issues.      Acute

## 2020-05-29 ENCOUNTER — TELEPHONE (OUTPATIENT)
Dept: FAMILY MEDICINE CLINIC | Facility: CLINIC | Age: 45
End: 2020-05-29

## 2020-05-29 NOTE — TELEPHONE ENCOUNTER
Pt called he is moving to [place] this weekend and he is wanting to know if he needs refills if Dr could refill scripts? He doesn't need anything refilled at this time but would want to get them in prior to losing his insurance.  He is also going to try to

## 2020-05-29 NOTE — TELEPHONE ENCOUNTER
Patient advised BHUPINDER could give a one time refill in [place] so he does not run out of med before he finds a new provider.   Patient will call when he needs refill next month to let BHUPINDER know what pharmacy

## 2020-06-09 DIAGNOSIS — F95.2 TOURETTE SYNDROME: ICD-10-CM

## 2020-06-09 DIAGNOSIS — I82.462 ACUTE DEEP VEIN THROMBOSIS (DVT) OF CALF MUSCLE VEIN OF LEFT LOWER EXTREMITY (HCC): Primary | ICD-10-CM

## 2020-06-09 DIAGNOSIS — F41.9 ANXIETY: ICD-10-CM

## 2020-06-09 DIAGNOSIS — G57.93 NEUROPATHY INVOLVING BOTH LOWER EXTREMITIES: ICD-10-CM

## 2020-06-09 RX ORDER — GABAPENTIN 300 MG/1
300 CAPSULE ORAL 3 TIMES DAILY
Qty: 180 CAPSULE | Refills: 0 | Status: SHIPPED
Start: 2020-06-09 | End: 2020-07-02

## 2020-06-09 RX ORDER — LORAZEPAM 1 MG/1
1 TABLET ORAL 2 TIMES DAILY PRN
Qty: 60 TABLET | Refills: 0 | Status: SHIPPED | OUTPATIENT
Start: 2020-06-09 | End: 2020-07-10

## 2020-06-09 NOTE — TELEPHONE ENCOUNTER
I will send in 60 day of scripts other than ativan, that is for 30 days. He should be able to find a doctor to see in the next 1-2 months. I can't keep prescribing meds in another state since I am not licensed there.

## 2020-06-09 NOTE — TELEPHONE ENCOUNTER
See 5/29/2020 tel enc  Has moved to [place] and needs 3 month supply until he can get in with new provider

## 2020-06-09 NOTE — TELEPHONE ENCOUNTER
LORazepam 1 MG Oral Tab- Pt is wanting to know if he could get a 60 day supply?      metoprolol Tartrate 25 MG Oral Tab    apixaban 5 MG Oral Tab    gabapentin 300 MG Oral Cap    Pt is almost out of some of the medications     Pt would like sent to [facility]

## 2020-07-02 DIAGNOSIS — G57.93 NEUROPATHY INVOLVING BOTH LOWER EXTREMITIES: ICD-10-CM

## 2020-07-02 RX ORDER — GABAPENTIN 300 MG/1
300 CAPSULE ORAL 3 TIMES DAILY
Qty: 90 CAPSULE | Refills: 0 | Status: SHIPPED
Start: 2020-07-02

## 2020-07-02 NOTE — TELEPHONE ENCOUNTER
Pt called to adv needs refill of     gabapentin 300 MG Oral Cap    ** pt only has today left for meds **    Please send on over to     [address]    Thank you

## 2020-07-02 NOTE — TELEPHONE ENCOUNTER
No refill protocol for this medication. Last refill: 6/9/2020 #180 with 0 refills  Last Visit: 5/15/2020  Next Visit: No future appointments. Forward to Dr. Kaleb Goldberg please advise on refills. Thanks.

## 2020-07-10 DIAGNOSIS — F41.9 ANXIETY: ICD-10-CM

## 2020-07-10 DIAGNOSIS — F95.2 TOURETTE SYNDROME: ICD-10-CM

## 2020-07-10 RX ORDER — LORAZEPAM 1 MG/1
1 TABLET ORAL 2 TIMES DAILY PRN
Qty: 60 TABLET | Refills: 0 | Status: SHIPPED | OUTPATIENT
Start: 2020-07-10 | End: 2020-08-11

## 2020-07-10 NOTE — TELEPHONE ENCOUNTER
Scripts sent, but pls call. He needs to find a new doctor down there. I will give him one more month, then we will stop prescribing or wean down dose.

## 2020-07-10 NOTE — TELEPHONE ENCOUNTER
NO refill protocol for:  Lorazepam:   Last refill: 6/09/2020 #60 with 0 refills    Metoprolol:  Last refill: #180 with 0 refills    Last office visit: 5/15/2020  Next Office visit: No future appointments.       Forward to Dr. Patria Escamilla, please advise on refill

## 2020-07-10 NOTE — TELEPHONE ENCOUNTER
Pt called, needs refills on LORazepam 1 MG Oral Tab and metoprolol Tartrate 25 MG Oral Tab. [facility]    Please call pt at [phone]

## 2020-08-10 DIAGNOSIS — F41.9 ANXIETY: ICD-10-CM

## 2020-08-10 DIAGNOSIS — F95.2 TOURETTE SYNDROME: ICD-10-CM

## 2020-08-10 RX ORDER — OMEPRAZOLE 40 MG/1
40 CAPSULE, DELAYED RELEASE ORAL DAILY
Qty: 30 CAPSULE | Refills: 0 | OUTPATIENT
Start: 2020-08-10

## 2020-08-10 RX ORDER — LORAZEPAM 1 MG/1
1 TABLET ORAL 2 TIMES DAILY PRN
Qty: 60 TABLET | Refills: 0 | OUTPATIENT
Start: 2020-08-10

## 2020-08-10 NOTE — TELEPHONE ENCOUNTER
Pt needs a refill of the  LORazepam 1 MG Oral Tab  And  Omeprazole 40 MG Oral Capsule Delayed Release    [facility] in [facility]

## 2020-08-11 RX ORDER — LORAZEPAM 0.5 MG/1
TABLET ORAL
Qty: 35 TABLET | Refills: 0 | Status: SHIPPED | OUTPATIENT
Start: 2020-08-11 | End: 2020-09-10

## 2020-08-11 NOTE — TELEPHONE ENCOUNTER
Patient notified and verbalized understanding. States he does not have insurance and has not found another doctor. States he can get omeprazole OTC but is out of lorazepam and can not just stop it.     Per KE, can send in tapering dose to wean off so he

## 2021-04-07 ENCOUNTER — PATIENT OUTREACH (OUTPATIENT)
Dept: FAMILY MEDICINE CLINIC | Facility: CLINIC | Age: 46
End: 2021-04-07

## 2021-04-07 NOTE — PROGRESS NOTES
Please see refill encounter on 6/9/2020    Per Dr. Cathy Jeffery: \"He should be able to find a doctor to see in the next 1-2 months. I can't keep prescribing meds in another state since I am not licensed there. \"    No longer patient of Dr. Cathy Jeffery

## 2021-04-07 NOTE — TELEPHONE ENCOUNTER
So far as I know, Alli Brady moved down [facility]. Kelsea? I don't believe he is my pt anymore.

## 2021-09-21 NOTE — LETTER
04/07/21            Lino Rump   [address] [address]           Dear Stevie Avendano records indicate that you have testing that was ordered for you:     CT    To provide you with the best possible care, please complete these or
Yes

## 2022-12-24 NOTE — PATIENT INSTRUCTIONS
Refill policies:    • Allow 2-3 business days for refills; controlled substances may take longer.   • Contact your pharmacy at least 5 days prior to running out of medication and have them send an electronic request or submit request through the “request re · Continue methimazole been approved by your insurer. Depending on your insurance carrier, approval may take 3-10 days. It is highly recommended patients contact their insurance carrier directly to determine coverage.   If test is done without insurance authorization, patient ma
